# Patient Record
Sex: MALE | Race: OTHER | Employment: OTHER | URBAN - METROPOLITAN AREA
[De-identification: names, ages, dates, MRNs, and addresses within clinical notes are randomized per-mention and may not be internally consistent; named-entity substitution may affect disease eponyms.]

---

## 2017-05-17 NOTE — PATIENT DISCUSSION
LAMONTE OU:  PRESCRIBED UV PROTECTION TO SLOW GROWTH. PRESCRIBE ARTIFICAL TEARS TO INCREASE COMFORT.

## 2017-05-17 NOTE — PATIENT DISCUSSION
Continue: PreserVision AREDS 2 (vit c,c-sr-sqvqt-lutein-zeaxan): capsule: 019-725-31-8 mg-unit-mg-mg

## 2017-05-17 NOTE — PATIENT DISCUSSION
AMD (DRY), OU:  PRESCRIBE AREDS 2 VITAMINS AND UV PROTECTION TO SLOW PROGRESSION. SMOKING CESSATION EMPHASIZED. PRESCRIBE REGULAR AMSLER GRID CHECKS TO SELF MONITOR. RETURN FOR FOLLOW-UP AS SCHEDULED WITH DR. Abdirizak Ashford.

## 2017-05-17 NOTE — PATIENT DISCUSSION
FUCHS' ENDOTHELIAL DYSTROPHY,OU:  MILD GUTTATA AND NO EDEMA. NO TREATMENT INDICATED. CONSIDER CORNEAL CONSULT IF WORSENS.

## 2017-06-09 NOTE — PATIENT DISCUSSION
Continue: PreserVision AREDS 2 (vit c,n-co-jdjaz-lutein-zeaxan): capsule: 740-472-74-9 mg-unit-mg-mg

## 2017-06-09 NOTE — PATIENT DISCUSSION
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  RETINAL DETACHMENT SIGNS AND SYMPTOMS REVIEWED

## 2017-11-15 NOTE — PATIENT DISCUSSION
Continue: PreserVision AREDS 2 (vit c,p-yu-hooek-lutein-zeaxan): capsule: 385-712-54-5 mg-unit-mg-mg

## 2017-12-08 NOTE — PATIENT DISCUSSION
STABLE NON-EXUDATIVE AMD:  HIGH RISK DRUSENOID PED'S. PATIENT UNABLE TO USE FORESEE HOME DEVICE. CONTINUE AREDS 2 VITAMINS / AMSLER GRID QD/ UV PROTECTION. SMOKING AVOIDANCE REVIEWED. RETURN FOR FOLLOW-UP AS SCHEDULED.

## 2017-12-08 NOTE — PATIENT DISCUSSION
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 PRESCRIBED UV PROTECTION TO MINIMIZE UV EXPOSURE. RETURN AS SCHEDULED FOR EVALUATION AND PHOTO DOCUMENTATION.

## 2017-12-08 NOTE — PATIENT DISCUSSION
Continue: PreserVision AREDS 2 (vit c,h-dv-gpjhf-lutein-zeaxan): capsule: 349-473-85-5 mg-unit-mg-mg

## 2018-02-07 NOTE — PATIENT DISCUSSION
STABLE NON-EXUDATIVE AMD:  HIGH RISK PED OU WITHOUT PROGRESSION ON EXAM TODAY. CONTINUE AREDS 2 VITAMINS / AMSLER GRID QD/ UV PROTECTION. SMOKING AVOIDANCE REVIEWED. RETURN FOR FOLLOW-UP AS SCHEDULED.

## 2018-02-07 NOTE — PATIENT DISCUSSION
Continue: PreserVision AREDS 2 (vit c,q-ud-mljsi-lutein-zeaxan): capsule: 052-587-92-7 mg-unit-mg-mg

## 2018-02-07 NOTE — PATIENT DISCUSSION
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  RETINAL DETACHMENT SIGNS AND SYMPTOMS REVIEWED

## 2018-02-07 NOTE — PATIENT DISCUSSION
FUCHS DYSTROPHY OU: GRADUAL INCREASE IN CORNEA GUTTATA AND BLURRY VISION. REFER TO DR Adrian Fonseca FOR CORNEA EVAL.

## 2018-02-22 NOTE — PATIENT DISCUSSION
Continue: PreserVision AREDS 2 (vit c,k-sm-wiupb-lutein-zeaxan): capsule: 362-796-65-2 mg-unit-mg-mg

## 2018-02-22 NOTE — PATIENT DISCUSSION
FUCHS' ENDOTHELIAL DYSTROPHY,OU:  MODERATE GUTTATA AND MILD EDEMA. PRESCRIBE LAVELLE GTTS TID-QID AND LAVELLE SHAILESH QHS. RETURN FOR FOLLOW-UP AS SCHEDULED.

## 2018-02-22 NOTE — PATIENT DISCUSSION
Uncertain what corrected visual potential would be following a transplant due to history of retinal issues.

## 2018-04-19 NOTE — PATIENT DISCUSSION
Continue: PreserVision AREDS 2 (vit c,y-um-wmeoc-lutein-zeaxan): capsule: 163-577-36-7 mg-unit-mg-mg

## 2018-06-14 NOTE — PATIENT DISCUSSION
Fuchs' Endothelial Dystrophy Counseling: The diagnosis and its pathophysiology was explained to the patient. The need for a corneal transplant to restore vision and prevent possible infections and/or pain have been explained.

## 2018-06-14 NOTE — PATIENT DISCUSSION
R/B/A's of DMEK  in detail with patient including use of gas bubble to adhere corneal tissue. Discussed laying flat to improve outcome 24-72 hours. Discussed expect blurred vision initially after surgery - possible additional gas inserted in OR. Explained likely blurred vision while healing on average 3-6 months. 20/20 is NOT the goal - aiming for better quality of vision with outcome of 20/30.

## 2018-06-14 NOTE — PATIENT DISCUSSION
Uncertain what corrected visual potential would be following a transplant due to history of retinal issues. Patient voices understanding that his vision may have issues due to retina also.

## 2018-06-14 NOTE — PATIENT DISCUSSION
Fuchs' Endothelial Dystrophy Counseling OD:  The diagnosis and its pathophysiology was explained to the patient. Discussed option of partial thickness corneal transplant in detail with patient. Discussed DMEK - when patient feels it's visually significant.

## 2018-06-14 NOTE — PATIENT DISCUSSION
Continue: PreserVision AREDS 2 (vit c,s-gp-vmmpm-lutein-zeaxan): capsule: 957-701-77-1 mg-unit-mg-mg 2

## 2018-07-17 NOTE — PATIENT DISCUSSION
Continue: prednisolone acetate (prednisolone acetate): drops,suspension: 1% 1 drop four times a day into right eye 06-

## 2018-07-17 NOTE — PATIENT DISCUSSION
Continue: PreserVision AREDS 2 (vit c,o-pg-yxjlx-lutein-zeaxan): capsule: 839-918-70-5 mg-unit-mg-mg 2

## 2018-07-19 NOTE — PATIENT DISCUSSION
Continue: PreserVision AREDS 2 (vit c,k-sl-isgzd-lutein-zeaxan): capsule: 229-546-51-8 mg-unit-mg-mg 2

## 2018-07-26 NOTE — PATIENT DISCUSSION
Continue: Refresh Celluvisc (carboxymethylcellulose sodium): dropperette,gel: 1% as needed into right eye

## 2018-07-26 NOTE — PATIENT DISCUSSION
Continue: PreserVision AREDS 2 (vit c,w-ru-rjnmn-lutein-zeaxan): capsule: 037-880-50-6 mg-unit-mg-mg 2

## 2018-08-08 NOTE — PATIENT DISCUSSION
Continue: PreserVision AREDS 2 (vit c,f-xg-qybda-lutein-zeaxan): capsule: 769-512-34-7 mg-unit-mg-mg 2

## 2018-08-16 NOTE — PATIENT DISCUSSION
Continue: PreserVision AREDS 2 (vit c,d-vc-uiqnh-lutein-zeaxan): capsule: 517-165-51-3 mg-unit-mg-mg 2

## 2018-09-27 NOTE — PATIENT DISCUSSION
Continue: PreserVision AREDS 2 (vit c,f-vg-yxhlw-lutein-zeaxan): capsule: 052-945-80-1 mg-unit-mg-mg 2

## 2018-09-27 NOTE — PATIENT DISCUSSION
New Prescription: ofloxacin (ofloxacin): drops: 0.3% 1 drop four times a day as directed into left eye 09-

## 2018-10-09 NOTE — PATIENT DISCUSSION
Continue: PreserVision AREDS 2 (vit c,v-dn-kebmz-lutein-zeaxan): capsule: 330-044-01-7 mg-unit-mg-mg 2

## 2018-10-09 NOTE — PATIENT DISCUSSION
Continue: prednisolone acetate (prednisolone acetate): drops,suspension: 1% 1 drop twice a day into right eye 06-

## 2018-10-09 NOTE — PATIENT DISCUSSION
New Prescription: prednisolone acetate (prednisolone acetate): drops,suspension: 1% 1 drop four times a day into left eye 10-

## 2018-10-09 NOTE — PATIENT DISCUSSION
gave hand written RX to take to [place] for Prednisolone 1% (they gave 12%/pred mild) last time. also sent to [facility] as a back up.

## 2018-10-11 NOTE — PATIENT DISCUSSION
Continue: PreserVision AREDS 2 (vit c,w-ji-fmutq-lutein-zeaxan): capsule: 841-686-52-6 mg-unit-mg-mg 2

## 2018-10-11 NOTE — PATIENT DISCUSSION
Continue: prednisolone acetate (prednisolone acetate): drops,suspension: 1% 1 drop four times a day into left eye 10-

## 2018-10-18 NOTE — PATIENT DISCUSSION
Continue: PreserVision AREDS 2 (vit c,j-yf-eklox-lutein-zeaxan): capsule: 368-682-84-9 mg-unit-mg-mg 2

## 2018-10-25 NOTE — PATIENT DISCUSSION
Continue: prednisolone acetate (prednisolone acetate): drops,suspension: 1% 1 drop every three hours as directed into left eye 10-

## 2018-10-25 NOTE — PATIENT DISCUSSION
Continue: PreserVision AREDS 2 (vit c,j-rz-mrrsf-lutein-zeaxan): capsule: 415-738-50-9 mg-unit-mg-mg 2

## 2018-11-08 NOTE — PATIENT DISCUSSION
Continue: PreserVision AREDS 2 (vit c,c-fl-cqdlq-lutein-zeaxan): capsule: 677-675-68-2 mg-unit-mg-mg 2

## 2018-11-29 NOTE — PATIENT DISCUSSION
Continue: PreserVision AREDS 2 (vit c,i-ac-iwvap-lutein-zeaxan): capsule: 874-105-70-5 mg-unit-mg-mg 2

## 2018-12-27 NOTE — PATIENT DISCUSSION
Continue: PreserVision AREDS 2 (vit c,g-ny-zhcyo-lutein-zeaxan): capsule: 819-718-06-7 mg-unit-mg-mg 2

## 2018-12-27 NOTE — PATIENT DISCUSSION
Continue: timolol maleate (timolol maleate): drops: 0.5% 1 drop every morning as directed into both eyes 12-

## 2019-01-17 NOTE — PATIENT DISCUSSION
Continue: PreserVision AREDS 2 (vit c,f-ia-segts-lutein-zeaxan): capsule: 960-201-50-6 mg-unit-mg-mg 2

## 2019-01-17 NOTE — PATIENT DISCUSSION
REFRACTION: PATIENT DEFERS THE COMPLETED REFRACTION TODAY. THE REFRACTION WAS DOCUMENTED FOR TESTING PURPOSES.

## 2019-02-06 NOTE — PATIENT DISCUSSION
Continue: timolol maleate (timolol maleate): drops: 0.5% 1 drop every morning as directed into both eyes 01-

## 2019-02-06 NOTE — PATIENT DISCUSSION
Continue: PreserVision AREDS 2 (vit c,y-xq-migxq-lutein-zeaxan): capsule: 424-021-76-6 mg-unit-mg-mg 2

## 2019-02-28 NOTE — PATIENT DISCUSSION
Continue: PreserVision AREDS 2 (vit c,j-ej-vujsm-lutein-zeaxan): capsule: 615-334-80-8 mg-unit-mg-mg 2

## 2019-02-28 NOTE — PATIENT DISCUSSION
Continue: timolol maleate (timolol maleate): drops: 0.5% 1 drop twice a day as directed into both eyes 01-

## 2019-04-04 NOTE — PATIENT DISCUSSION
Continue: prednisolone acetate (prednisolone acetate): drops,suspension: 1% 1 drop once a day into right eye 06-

## 2019-04-04 NOTE — PATIENT DISCUSSION
Continue: PreserVision AREDS 2 (vit c,p-ij-bnhbd-lutein-zeaxan): capsule: 528-538-24-6 mg-unit-mg-mg 2

## 2019-06-27 NOTE — PATIENT DISCUSSION
Continue: PreserVision AREDS 2 (vit c,p-uk-keilr-lutein-zeaxan): capsule: 264-517-39-2 mg-unit-mg-mg 2

## 2019-08-07 NOTE — PATIENT DISCUSSION
Continue: PreserVision AREDS 2 (vit c,m-cu-vqqcp-lutein-zeaxan): capsule: 744-745-89-1 mg-unit-mg-mg 2

## 2019-08-07 NOTE — PATIENT DISCUSSION
New Prescription: Cosopt PF (dorzolamide-timolol (pf)): dropperette: 2-0.5% 1 drop twice a day as directed into both eyes 08-

## 2019-08-07 NOTE — PATIENT DISCUSSION
Stopped Today: timolol maleate (timolol maleate): drops: 0.5% 1 drop twice a day as directed into both eyes

## 2019-09-14 NOTE — PATIENT DISCUSSION
Continue: PreserVision AREDS 2 (vit c,x-ry-shqij-lutein-zeaxan): capsule: 204-262-70-1 mg-unit-mg-mg 2

## 2019-09-14 NOTE — PATIENT DISCUSSION
Continue: Cosopt (dorzolamide-timolol): drops: 2-0.5% 1 drop twice a day as directed into both eyes 08-

## 2019-09-14 NOTE — PATIENT DISCUSSION
RETINA IS ATTACHED OU: PVD OU; CNV AND MACULAR EDEMA DECREASED OS; NO HOLES OR TEARS SEEN ON DILATED EXAM TODAY.  RETINAL DETACHMENT SIGNS AND SYMPTOMS REVIEWED

## 2019-09-19 NOTE — PATIENT DISCUSSION
Continue: PreserVision AREDS 2 (vit c,u-ht-kejod-lutein-zeaxan): capsule: 712-216-83-8 mg-unit-mg-mg 2

## 2019-10-23 NOTE — PATIENT DISCUSSION
Continue: PreserVision AREDS 2 (vit c,k-db-tysae-lutein-zeaxan): capsule: 003-106-04-4 mg-unit-mg-mg 2

## 2019-11-14 NOTE — PATIENT DISCUSSION
Continue: PreserVision AREDS 2 (vit c,f-pi-hiqms-lutein-zeaxan): capsule: 980-819-95-8 mg-unit-mg-mg 2

## 2019-11-14 NOTE — PATIENT DISCUSSION
Continue: prednisolone acetate (prednisolone acetate): drops,suspension: 1% 1 drop three times a week into both eyes 06-

## 2019-11-20 NOTE — PATIENT DISCUSSION
Continue: Cosopt (dorzolamide-timolol): drops: 2-0.5% 1 drop three times a day as directed into both eyes 08-

## 2019-11-20 NOTE — PATIENT DISCUSSION
Continue: PreserVision AREDS 2 (vit c,e-bl-xlgjg-lutein-zeaxan): capsule: 518-073-25-2 mg-unit-mg-mg 2

## 2019-11-26 NOTE — PATIENT DISCUSSION
Continue: PreserVision AREDS 2 (vit c,k-uf-uibio-lutein-zeaxan): capsule: 478-663-72-2 mg-unit-mg-mg 2

## 2019-12-18 NOTE — PATIENT DISCUSSION
AMD (WET), OS:  AVASTIN (1.25MG) INTRAVITREAL INJECTION. RETURN AS SCHEDULED.
AMD (Wet) Counseling:  I have reviewed with the patient the diagnosis of wet macular degeneration and its pathophysiology.   I further explained that this condition is a severe eye disease which should be monitored and treated by a retinal specialist.
Avastin 1.25MG (Office) 1.25 mg / 0.05 ml - OS: Surgeon: Janelle Yap
COUNSELING:
Continue: Cosopt (dorzolamide-timolol): drops: 2-0.5% 1 drop three times a day as directed into both eyes 08-
Continue: PreserVision AREDS 2 (vit c,c-yw-jyuwr-lutein-zeaxan): capsule: 771-985-85-5 mg-unit-mg-mg 2
Continue: prednisolone acetate (prednisolone acetate): drops,suspension: 1% 1 drop three times a week into both eyes 06-
General:
Intravitreal Injections:
Medications:
Post-injection Counseling: Patients treated with intravitreal injection may notice eye irritation or  burning for 12-24 hours after injection. Some patients may have a small patch of hemorrhage under the skin of the eye, which will fade in a few days. Worsening pain, decreasing vision, or new floaters should prompt you to call the office.
72y/o M presents to hospital after developing hypoglycemia at home. The pt reports that yesterday he went to bed around mid day and his daughter and wife called 911 when they could not arouse him. He was found to have a FSG of 27 and was given d50 and juice.

## 2020-01-22 NOTE — PATIENT DISCUSSION
Continue: PreserVision AREDS 2 (vit c,d-bi-vajhp-lutein-zeaxan): capsule: 473-731-27-0 mg-unit-mg-mg 2

## 2020-01-22 NOTE — PATIENT DISCUSSION
RETINA IS ATTACHED OU: PVD OU; MACULAR RPE TEAR OS; NO HOLES OR TEARS SEEN ON DILATED EXAM TODAY.  RETINAL DETACHMENT SIGNS AND SYMPTOMS REVIEWED

## 2020-02-19 NOTE — PATIENT DISCUSSION
Continue: PreserVision AREDS 2 (vit c,y-ak-njnep-lutein-zeaxan): capsule: 163-425-02-2 mg-unit-mg-mg 2

## 2020-03-18 NOTE — PATIENT DISCUSSION
Continue: PreserVision AREDS 2 (vit c,b-cp-wzfxa-lutein-zeaxan): capsule: 238-369-62-0 mg-unit-mg-mg 2

## 2020-03-19 NOTE — PATIENT DISCUSSION
Patient needs VF and OCT either prior to next appointment (around June) or day of next appointment if possible.

## 2020-03-19 NOTE — PATIENT DISCUSSION
Continue: dorzolamide-timolol (dorzolamide-timolol): drops: 2-0.5% 1 drop three times a day into both eyes 08-

## 2020-03-19 NOTE — PATIENT DISCUSSION
Continue: PreserVision AREDS 2 (vit c,v-kd-jxuga-lutein-zeaxan): capsule: 468-980-97-6 mg-unit-mg-mg 2

## 2020-04-15 NOTE — PATIENT DISCUSSION
Continue: PreserVision AREDS 2 (vit c,g-jf-aeiiz-lutein-zeaxan): capsule: 969-838-94-3 mg-unit-mg-mg 2

## 2020-05-13 NOTE — PATIENT DISCUSSION
POAG, OU: INTRAOCULAR PRESSURE IS WITHIN ACCEPTABLE LIMITS. PT INSTRUCTED TO CONTINUE COSOPT 3X/DAY OU; AND RETURN FOR FOLLOW-UP AS SCHEDULED WITH DR Deana Hagen.

## 2020-05-13 NOTE — PATIENT DISCUSSION
Continue: PreserVision AREDS 2 (vit c,d-ee-jrppu-lutein-zeaxan): capsule: 298-066-52-5 mg-unit-mg-mg 2

## 2020-05-13 NOTE — PATIENT DISCUSSION
STABLE NON-EXUDATIVE AMD, OD: CONTINUE AREDS 2 VITAMINS / AMSLER GRID QD/ UV PROTECTION. SMOKING AVOIDANCE REVIEWED. RETURN FOR FOLLOW-UP AS SCHEDULED.

## 2020-06-11 NOTE — PATIENT DISCUSSION
Continue: dorzolamide-timolol (dorzolamide-timolol): drops: 2-0.5% 1 drop three times a day into left eye 08-

## 2020-06-11 NOTE — PATIENT DISCUSSION
Continue: PreserVision AREDS 2 (vit c,c-vn-nbcjo-lutein-zeaxan): capsule: 891-985-92-8 mg-unit-mg-mg 2

## 2020-06-11 NOTE — PATIENT DISCUSSION
Continue: prednisolone acetate (prednisolone acetate): drops,suspension: 1% 1 drop three times a week into right eye 06-

## 2020-06-17 NOTE — PATIENT DISCUSSION
Stopped Today: prednisolone acetate (prednisolone acetate): drops,suspension: 1% 1 drop three times a week into right eye 06-

## 2020-06-17 NOTE — PATIENT DISCUSSION
Continue: PreserVision AREDS 2 (vit c,d-kk-ejybl-lutein-zeaxan): capsule: 298-109-02-5 mg-unit-mg-mg 2

## 2020-07-01 NOTE — PATIENT DISCUSSION
Continue: PreserVision AREDS 2 (vit c,l-hg-ambhw-lutein-zeaxan): capsule: 029-638-43-2 mg-unit-mg-mg 2

## 2020-07-01 NOTE — PATIENT DISCUSSION
Stopped Today: dorzolamide-timolol (dorzolamide-timolol): drops: 2-0.5% 1 drop three times a day into left eye 08-

## 2020-07-15 NOTE — PATIENT DISCUSSION
Continue: PreserVision AREDS 2 (vit c,z-ej-nwvqb-lutein-zeaxan): capsule: 774-916-15-9 mg-unit-mg-mg 2

## 2020-08-19 NOTE — PATIENT DISCUSSION
Continue: PreserVision AREDS 2 (vit c,m-rr-epczp-lutein-zeaxan): capsule: 591-503-00-1 mg-unit-mg-mg 2

## 2020-09-30 NOTE — PATIENT DISCUSSION
Continue: PreserVision AREDS 2 (vit c,j-pn-ykxrx-lutein-zeaxan): capsule: 451-378-94-2 mg-unit-mg-mg 2

## 2020-09-30 NOTE — PATIENT DISCUSSION
New Prescription: latanoprost (latanoprost): drops: 0.005% 1 drop at bedtime as directed into left eye 09-

## 2020-09-30 NOTE — PATIENT DISCUSSION
Stopped Today: latanoprost (latanoprost): drops: 0.005% 1 drop at bedtime as directed into both eyes 09-

## 2020-09-30 NOTE — PATIENT DISCUSSION
Stopped Today: timolol maleate (timolol maleate): drops, once daily: 0.5% 1 drop twice a day as directed into left eye 09-

## 2020-10-08 NOTE — PATIENT DISCUSSION
Continue: PreserVision AREDS 2 (vit c,u-vt-ggztu-lutein-zeaxan): capsule: 957-694-96-9 mg-unit-mg-mg 2

## 2020-10-08 NOTE — PATIENT DISCUSSION
IF IOP not lowering when he sees Dr. Ralf Leung, he  can send pt back Dr. Dorcas Dancer to evaluate further

## 2020-10-28 NOTE — PATIENT DISCUSSION
New Prescription: Michi Santos (brinzolamide-brimonidine): drops,suspension: 1-0.2% 1 drop twice a day as directed into left eye 10-

## 2020-10-28 NOTE — PATIENT DISCUSSION
POAG, OU: BEGIN SIMBRINZA 2X/DAY OS ONLY (RX GIVEN). CONTINUE LATANOPROST QHS OU. RETURN AS SCHEDULED.

## 2020-10-28 NOTE — PATIENT DISCUSSION
Continue: PreserVision AREDS 2 (vit c,h-nu-nsafl-lutein-zeaxan): capsule: 354-464-83-2 mg-unit-mg-mg 2

## 2020-11-25 NOTE — PATIENT DISCUSSION
DISCUSSED GUARDED VISUAL PROGNOSIS OS, DUE TO PROGRESSION OF AMD AND RPE TEAR. DISCUSSED THAT EVIDENCE CONTINUES TO SUPPORT ANTI-VEGF THERAPY AS RECOMMENDED TREATMENT OPTION.

## 2020-11-25 NOTE — PATIENT DISCUSSION
Continue: PreserVision AREDS 2 (vit c,j-ay-cfolo-lutein-zeaxan): capsule: 223-452-69-5 mg-unit-mg-mg 2

## 2020-11-25 NOTE — PATIENT DISCUSSION
Continue: Faisal Baez (brinzolamide-brimonidine): drops,suspension: 1-0.2% 1 drop twice a day as directed into left eye 10-

## 2020-12-23 NOTE — PATIENT DISCUSSION
Continue: Delbert Rawls (brinzolamide-brimonidine): drops,suspension: 1-0.2% 1 drop twice a day as directed into left eye 10-

## 2020-12-23 NOTE — PATIENT DISCUSSION
Continue: PreserVision AREDS 2 (vit c,u-aq-ixacy-lutein-zeaxan): capsule: 398-645-54-3 mg-unit-mg-mg 2

## 2021-01-20 NOTE — PATIENT DISCUSSION
Continue: PreserVision AREDS 2 (vit c,k-vk-ykmub-lutein-zeaxan): capsule: 984-431-60-0 mg-unit-mg-mg 2

## 2021-01-20 NOTE — PATIENT DISCUSSION
Continue: [address] (brinzolamide-brimonidine): drops,suspension: 1-0.2% 1 drop twice a day as directed into left eye 10-

## 2021-02-24 NOTE — PATIENT DISCUSSION
Continue: PreserVision AREDS 2 (vit c,e-od-rwsfn-lutein-zeaxan): capsule: 654-032-69-5 mg-unit-mg-mg 2

## 2021-02-24 NOTE — PATIENT DISCUSSION
Continue: [place] Piper (brinzolamide-brimonidine): drops,suspension: 1-0.2% 1 drop twice a day as directed into left eye 10-

## 2021-03-31 NOTE — PATIENT DISCUSSION
Stopped Today: Simbrinza (brinzolamide-brimonidine): drops,suspension: 1-0.2% 1 drop twice a day as directed into left eye 10-

## 2021-03-31 NOTE — PATIENT DISCUSSION
Continue: PreserVision AREDS 2 (vit c,p-ip-gdsih-lutein-zeaxan): capsule: 678-652-72-7 mg-unit-mg-mg 2

## 2021-04-28 NOTE — PATIENT DISCUSSION
Continue: PreserVision AREDS 2 (vit c,w-zu-yyris-lutein-zeaxan): capsule: 276-699-52-2 mg-unit-mg-mg 2

## 2021-05-26 NOTE — PATIENT DISCUSSION
Continue: PreserVision AREDS 2 (vit c,i-ta-rhbns-lutein-zeaxan): capsule: 086-213-70-4 mg-unit-mg-mg 2

## 2021-06-30 NOTE — PATIENT DISCUSSION
Continue: PreserVision AREDS 2 (vit c,w-to-mftcp-lutein-zeaxan): capsule: 046-147-98-3 mg-unit-mg-mg 2

## 2021-06-30 NOTE — PATIENT DISCUSSION
POAG, OU: ELEVATED INTRAOCULAR PRESSURE, OS. CURRENTLY OFF ALL DROPS. PRESCRIBED TRIAL OF LATANOPROST QHS OS (RX WRITTEN). IF TOLERATING WELL AT NEXT VISIT, THEN CONTINUE IN BOTH EYES.

## 2021-08-11 NOTE — PATIENT DISCUSSION
Continue: PreserVision AREDS 2 (vit c,u-an-kfyjr-lutein-zeaxan): capsule: 492-989-71-4 mg-unit-mg-mg 2

## 2021-10-06 NOTE — PROCEDURE NOTE: CLINICAL
PROCEDURE NOTE: Eylea 2mg OS. Diagnosis: Neovascular AMD with Active CNV. Prep: Betadine Drops and Betadine Scrub. Prior to injection, risks/benefits/alternatives discussed including corneal abrasion, infection, loss of vision, hemorrhage, cataract, glaucoma, retinal tears or detachment. A written consent is on file, and the need for today’s injection was discussed and the patient is understanding and wishes to proceed. The entire vial of Eylea was drawn up into a syringe. The opened vial, remaining drug, and filtered needle were disposed of in a certified biohazard container. Betadine prep was performed. Topical anesthesia was induced with PROPARACAINE AND SUB CONJ LIDOCAIN. A lid speculum was used. A short 30g needle on a 1cc syringe was used with product that that had previously been prepared under sterile conditions. Injection site: 3-4 mm from the limbus. The used syringe/needle was transferred to a biohazard container. Lid speculum removed. Mask worn during procedure. Patient tolerated procedure well. Count fingers vision was verified. There were no complications. Patient was given the standard instruction sheet. Patient given office phone number/answering service number and advised to call immediately should there be loss of vision or pain, or should they have any other questions or concerns. Ananth Iniguez Verified Results  CYCLIC CITRUL PEP AB [id number] 07:53AM BERNHEIM, BRUCE   [May 12, 2017 1:55PM BERNHEIM, BRUCE]  ok     Test Name Result Flag Reference   CYCLIC CITRUL PEP AB 6 Units  <20   Approximately 70% of patients with Rheumatoid Arthritis(RA) are positive for CCP AB while only 2% of random blood donors are positive. The diagnostic value of CCP AB in juvenile RA patients has not been determined.

## 2021-11-25 NOTE — PATIENT DISCUSSION
Continue: PreserVision AREDS 2 (vit c,e-my-osqtt-lutein-zeaxan): capsule: 593-306-25-8 mg-unit-mg-mg 2 - - -

## 2021-12-01 NOTE — PROCEDURE NOTE: CLINICAL
PROCEDURE NOTE: Eylea 2mg OS. Diagnosis: Neovascular AMD with Active CNV. Anesthesia: Topical/Subconjunctival. Prep: Betadine Drops and Betadine Scrub. Prior to injection, risks/benefits/alternatives discussed including corneal abrasion, infection, loss of vision, hemorrhage, cataract, glaucoma, retinal tears or detachment. A written consent is on file, and the need for today’s injection was discussed and the patient is understanding and wishes to proceed. The entire vial of Eylea was drawn up into a syringe. The opened vial, remaining drug, and filtered needle were disposed of in a certified biohazard container. Betadine prep was performed. Topical anesthesia was induced with Alcaine. 4% lidocaine pledge. A lid speculum was used. A short 30g needle on a 1cc syringe was used with product that that had previously been prepared under sterile conditions. Injection site: 3-4 mm from the limbus. The used syringe/needle was transferred to a biohazard container. Lid speculum removed. Mask worn during procedure. Patient tolerated procedure well. Count fingers vision was verified. There were no complications. Patient was given the standard instruction sheet. Patient given office phone number/answering service number and advised to call immediately should there be loss of vision or pain, or should they have any other questions or concerns. Jerry Araiza

## 2022-02-09 NOTE — PROCEDURE NOTE: CLINICAL
PROCEDURE NOTE: Eylea 2mg OS. Diagnosis: Neovascular AMD with Active CNV. Anesthesia: Topical/Subconjunctival. Prep: Betadine Drops and Betadine Scrub. Prior to injection, risks/benefits/alternatives discussed including corneal abrasion, infection, loss of vision, hemorrhage, cataract, glaucoma, retinal tears or detachment. A written consent is on file, and the need for today’s injection was discussed and the patient is understanding and wishes to proceed. The entire vial of Eylea was drawn up into a syringe. The opened vial, remaining drug, and filtered needle were disposed of in a certified biohazard container. Betadine prep was performed. Topical anesthesia was induced with Alcaine. 4% lidocaine pledge. A lid speculum was used. A short 30g needle on a 1cc syringe was used with product that that had previously been prepared under sterile conditions. Injection site: 3-4 mm from the limbus. The used syringe/needle was transferred to a biohazard container. Lid speculum removed. Mask worn during procedure. Patient tolerated procedure well. Count fingers vision was verified. There were no complications. Patient was given the standard instruction sheet. Patient given office phone number/answering service number and advised to call immediately should there be loss of vision or pain, or should they have any other questions or concerns. Gerri Winston

## 2022-04-06 NOTE — PROCEDURE NOTE: CLINICAL
PROCEDURE NOTE: Eylea 2mg OS. Diagnosis: Neovascular AMD with Active CNV. Anesthesia: Topical/Subconjunctival. Prep: Betadine Drops and Betadine Scrub. Prior to injection, risks/benefits/alternatives discussed including corneal abrasion, infection, loss of vision, hemorrhage, cataract, glaucoma, retinal tears or detachment. A written consent is on file, and the need for today’s injection was discussed and the patient is understanding and wishes to proceed. The entire vial of Eylea was drawn up into a syringe. The opened vial, remaining drug, and filtered needle were disposed of in a certified biohazard container. Betadine prep was performed. Topical anesthesia was induced with Alcaine. 4% lidocaine pledge. A lid speculum was used. A short 30g needle on a 1cc syringe was used with product that that had previously been prepared under sterile conditions. Injection site: 3-4 mm from the limbus. The used syringe/needle was transferred to a biohazard container. Lid speculum removed. Mask worn during procedure. Patient tolerated procedure well. Count fingers vision was verified. There were no complications. Patient was given the standard instruction sheet. Patient given office phone number/answering service number and advised to call immediately should there be loss of vision or pain, or should they have any other questions or concerns. Janet Main

## 2022-04-11 ENCOUNTER — COMPREHENSIVE EXAM (OUTPATIENT)
Dept: URBAN - METROPOLITAN AREA CLINIC 46 | Facility: CLINIC | Age: 32
End: 2022-04-11

## 2022-04-11 DIAGNOSIS — Z97.3: ICD-10-CM

## 2022-04-11 DIAGNOSIS — H52.7: ICD-10-CM

## 2022-04-11 PROCEDURE — 92015 DETERMINE REFRACTIVE STATE: CPT

## 2022-04-11 PROCEDURE — 92004 COMPRE OPH EXAM NEW PT 1/>: CPT

## 2022-04-11 PROCEDURE — 92310-2 LEVEL 2 CONTACT LENS MANAGEMENT

## 2022-04-11 ASSESSMENT — VISUAL ACUITY
OD_CC: J1
OS_SC: J1+
OD_CC: 20/25-1
OS_CC: 20/20
OD_SC: J1+
OS_CC: J1+

## 2022-04-11 ASSESSMENT — TONOMETRY
OS_IOP_MMHG: 18
OD_IOP_MMHG: 17

## 2022-06-29 NOTE — PROCEDURE NOTE: CLINICAL
PROCEDURE NOTE: Eylea 2mg OS. Diagnosis: Neovascular AMD with Active CNV. Anesthesia: Topical/Subconjunctival. Prep: Betadine Drops and Betadine Scrub. Prior to injection, risks/benefits/alternatives discussed including corneal abrasion, infection, loss of vision, hemorrhage, cataract, glaucoma, retinal tears or detachment. A written consent is on file, and the need for today’s injection was discussed and the patient is understanding and wishes to proceed. The entire vial of Eylea was drawn up into a syringe. The opened vial, remaining drug, and filtered needle were disposed of in a certified biohazard container. Betadine prep was performed. Topical anesthesia was induced with Alcaine. 4% lidocaine pledge. A lid speculum was used. A short 30g needle on a 1cc syringe was used with product that that had previously been prepared under sterile conditions. Injection site: 3-4 mm from the limbus. The used syringe/needle was transferred to a biohazard container. Lid speculum removed. Mask worn during procedure. Patient tolerated procedure well. Count fingers vision was verified. There were no complications. Patient was given the standard instruction sheet. Patient given office phone number/answering service number and advised to call immediately should there be loss of vision or pain, or should they have any other questions or concerns. Yamile Corey

## 2022-08-10 NOTE — PROCEDURE NOTE: CLINICAL
PROCEDURE NOTE: Eylea 2mg OS. Diagnosis: Neovascular AMD with Active CNV. Anesthesia: Topical/Subconjunctival. Prep: Betadine Drops and Betadine Scrub. Prior to injection, risks/benefits/alternatives discussed including corneal abrasion, infection, loss of vision, hemorrhage, cataract, glaucoma, retinal tears or detachment. A written consent is on file, and the need for today’s injection was discussed and the patient is understanding and wishes to proceed. The entire vial of Eylea was drawn up into a syringe. The opened vial, remaining drug, and filtered needle were disposed of in a certified biohazard container. Betadine prep was performed. Topical anesthesia was induced with Alcaine. 4% lidocaine pledge. A lid speculum was used. A short 30g needle on a 1cc syringe was used with product that that had previously been prepared under sterile conditions. Injection site: 3-4 mm from the limbus. The used syringe/needle was transferred to a biohazard container. Lid speculum removed. Mask worn during procedure. Patient tolerated procedure well. Count fingers vision was verified. There were no complications. Patient was given the standard instruction sheet. Patient given office phone number/answering service number and advised to call immediately should there be loss of vision or pain, or should they have any other questions or concerns. Amber Durham

## 2023-01-20 NOTE — PATIENT DISCUSSION
AMD (WET), OS: RETURN IN ONE WEEK FOR AVASTIN (1.25MG) INTRAVITREAL INJECTION.
AMD (Wet) Counseling:  I have reviewed with the patient the diagnosis of wet macular degeneration and its pathophysiology.   I further explained that this condition is a severe eye disease which should be monitored and treated by a retinal specialist.
COUNSELING:
Continue: PreserVision AREDS 2 (vit c,p-[id number]-lutein-zeaxan): capsule: [id number] mg-unit-mg-mg 2
General:
Medications:
New Prescription: timolol maleate (timolol maleate): drops, once daily: 0.5% 1 drop twice a day as directed into left eye 09-
POAG with Elevated IOP Counseling:  I have explained the diagnosis of  glaucoma and discussed the importance of lowering intraocular pressure to prevent further optic nerve damage and possible blindness. I have discussed the various treatment options including medications, SLT laser and surgery. I have reviewed the regimen of drops with the patient. I emphasized to the patient the importance of compliance with treatment and follow-up appointments. Patient instructed to return for follow-up as scheduled.
POAG, OU:  START TIMOLOL 2X/DAY OS. IOP ELEVATED OS WITHOUT TREATMENT. RETURN AS SCHEDULED.
warm and dry/color normal/normal/no rashes/no ulcers